# Patient Record
Sex: MALE | Race: WHITE | NOT HISPANIC OR LATINO | Employment: OTHER | URBAN - METROPOLITAN AREA
[De-identification: names, ages, dates, MRNs, and addresses within clinical notes are randomized per-mention and may not be internally consistent; named-entity substitution may affect disease eponyms.]

---

## 2024-04-13 ENCOUNTER — HOSPITAL ENCOUNTER (EMERGENCY)
Facility: HOSPITAL | Age: 74
Discharge: HOME | End: 2024-04-13
Attending: EMERGENCY MEDICINE
Payer: MEDICARE

## 2024-04-13 VITALS
SYSTOLIC BLOOD PRESSURE: 122 MMHG | DIASTOLIC BLOOD PRESSURE: 76 MMHG | TEMPERATURE: 98 F | WEIGHT: 201.75 LBS | OXYGEN SATURATION: 98 % | HEART RATE: 60 BPM | RESPIRATION RATE: 15 BRPM

## 2024-04-13 DIAGNOSIS — D72.829 LEUKOCYTOSIS, UNSPECIFIED TYPE: ICD-10-CM

## 2024-04-13 DIAGNOSIS — R53.1 WEAKNESS: ICD-10-CM

## 2024-04-13 DIAGNOSIS — N18.2 STAGE 2 CHRONIC KIDNEY DISEASE: ICD-10-CM

## 2024-04-13 DIAGNOSIS — R20.2 PARESTHESIAS: Primary | ICD-10-CM

## 2024-04-13 LAB
ALBUMIN SERPL BCP-MCNC: 4.1 G/DL (ref 3.5–5.2)
ALP SERPL-CCNC: 55 U/L (ref 55–135)
ALT SERPL W/O P-5'-P-CCNC: 16 U/L (ref 10–44)
ANION GAP SERPL CALC-SCNC: 11 MMOL/L (ref 8–16)
AST SERPL-CCNC: 16 U/L (ref 10–40)
BASOPHILS # BLD AUTO: 0.05 K/UL (ref 0–0.2)
BASOPHILS NFR BLD: 0.3 % (ref 0–1.9)
BILIRUB SERPL-MCNC: 0.7 MG/DL (ref 0.1–1)
BILIRUB UR QL STRIP: NEGATIVE
BUN SERPL-MCNC: 41 MG/DL (ref 8–23)
CALCIUM SERPL-MCNC: 9.2 MG/DL (ref 8.7–10.5)
CHLORIDE SERPL-SCNC: 106 MMOL/L (ref 95–110)
CK SERPL-CCNC: 88 U/L (ref 20–200)
CLARITY UR: CLEAR
CO2 SERPL-SCNC: 22 MMOL/L (ref 23–29)
COLOR UR: YELLOW
CREAT SERPL-MCNC: 1.8 MG/DL (ref 0.5–1.4)
DIFFERENTIAL METHOD BLD: ABNORMAL
EOSINOPHIL # BLD AUTO: 0.1 K/UL (ref 0–0.5)
EOSINOPHIL NFR BLD: 1 % (ref 0–8)
ERYTHROCYTE [DISTWIDTH] IN BLOOD BY AUTOMATED COUNT: 14.3 % (ref 11.5–14.5)
EST. GFR  (NO RACE VARIABLE): 39 ML/MIN/1.73 M^2
GLUCOSE SERPL-MCNC: 120 MG/DL (ref 70–110)
GLUCOSE UR QL STRIP: NEGATIVE
HCT VFR BLD AUTO: 44.8 % (ref 40–54)
HGB BLD-MCNC: 14.7 G/DL (ref 14–18)
HGB UR QL STRIP: NEGATIVE
IMM GRANULOCYTES # BLD AUTO: 0.26 K/UL (ref 0–0.04)
IMM GRANULOCYTES NFR BLD AUTO: 1.8 % (ref 0–0.5)
KETONES UR QL STRIP: NEGATIVE
LEUKOCYTE ESTERASE UR QL STRIP: NEGATIVE
LYMPHOCYTES # BLD AUTO: 1 K/UL (ref 1–4.8)
LYMPHOCYTES NFR BLD: 6.5 % (ref 18–48)
MCH RBC QN AUTO: 28.2 PG (ref 27–31)
MCHC RBC AUTO-ENTMCNC: 32.8 G/DL (ref 32–36)
MCV RBC AUTO: 86 FL (ref 82–98)
MONOCYTES # BLD AUTO: 1 K/UL (ref 0.3–1)
MONOCYTES NFR BLD: 6.8 % (ref 4–15)
NEUTROPHILS # BLD AUTO: 12.2 K/UL (ref 1.8–7.7)
NEUTROPHILS NFR BLD: 83.6 % (ref 38–73)
NITRITE UR QL STRIP: NEGATIVE
NRBC BLD-RTO: 0 /100 WBC
PH UR STRIP: 6 [PH] (ref 5–8)
PLATELET # BLD AUTO: 246 K/UL (ref 150–450)
PMV BLD AUTO: 9.2 FL (ref 9.2–12.9)
POCT GLUCOSE: 110 MG/DL (ref 70–110)
POTASSIUM SERPL-SCNC: 4.5 MMOL/L (ref 3.5–5.1)
PROT SERPL-MCNC: 7.2 G/DL (ref 6–8.4)
PROT UR QL STRIP: NEGATIVE
RBC # BLD AUTO: 5.22 M/UL (ref 4.6–6.2)
SODIUM SERPL-SCNC: 139 MMOL/L (ref 136–145)
SP GR UR STRIP: 1.02 (ref 1–1.03)
TROPONIN I SERPL DL<=0.01 NG/ML-MCNC: <0.006 NG/ML (ref 0–0.03)
URN SPEC COLLECT METH UR: NORMAL
UROBILINOGEN UR STRIP-ACNC: NEGATIVE EU/DL
WBC # BLD AUTO: 14.6 K/UL (ref 3.9–12.7)

## 2024-04-13 PROCEDURE — 93010 ELECTROCARDIOGRAM REPORT: CPT | Mod: ,,, | Performed by: INTERNAL MEDICINE

## 2024-04-13 PROCEDURE — 93005 ELECTROCARDIOGRAM TRACING: CPT

## 2024-04-13 PROCEDURE — 82962 GLUCOSE BLOOD TEST: CPT

## 2024-04-13 PROCEDURE — 99285 EMERGENCY DEPT VISIT HI MDM: CPT | Mod: 25

## 2024-04-13 PROCEDURE — 81003 URINALYSIS AUTO W/O SCOPE: CPT | Performed by: EMERGENCY MEDICINE

## 2024-04-13 PROCEDURE — 82550 ASSAY OF CK (CPK): CPT | Performed by: EMERGENCY MEDICINE

## 2024-04-13 PROCEDURE — 80053 COMPREHEN METABOLIC PANEL: CPT | Performed by: EMERGENCY MEDICINE

## 2024-04-13 PROCEDURE — 84484 ASSAY OF TROPONIN QUANT: CPT | Performed by: EMERGENCY MEDICINE

## 2024-04-13 PROCEDURE — 85025 COMPLETE CBC W/AUTO DIFF WBC: CPT | Performed by: EMERGENCY MEDICINE

## 2024-04-13 NOTE — ED PROVIDER NOTES
SCRIBE #1 NOTE: I, Bakari Costa, am scribing for, and in the presence of, Ann Roy MD. I have scribed the entire note.       History     Chief Complaint   Patient presents with    Numbness     Pt states he was working in the year when he suddenly has numbness and tingling on his entire right side. Pt states it only lasted 5-10 seconds but now has some lasting weakness on the right side.       Review of patient's allergies indicates:  No Known Allergies      History of Present Illness     HPI    4/13/2024, 1:34 PM  History obtained from the patient      History of Present Illness: Ben Carlos is a 73 y.o. male patient with a PMHx of asthma and cancer 5 years ago who presents to the Emergency Department for evaluation of right sided numbness which onset suddenly while working outside in the yard this morning. Pt went down to the ground and the numbness lasted for 10 seconds. Pt feels that his  strength is much weaker on the right sided after numbness resolved. Symptoms are constant and moderate in severity. No mitigating or exacerbating factors reported. No associated sxs. Patient denies any back pain, fever, CP, SOB, and all other sxs at this time. No Prior Tx. No further complaints or concerns at this time.       Arrival mode: Personal vehicle     PCP: No primary care provider on file.        Past Medical History:  No past medical history on file.    Past Surgical History:  No past surgical history on file.      Family History:  No family history on file.    Social History:  Social History     Tobacco Use    Smoking status: Not on file    Smokeless tobacco: Not on file   Substance and Sexual Activity    Alcohol use: Not on file    Drug use: Not on file    Sexual activity: Not on file        Review of Systems     Review of Systems   Constitutional:  Negative for chills and fever.   HENT:  Negative for congestion and sore throat.    Respiratory:  Negative for cough and shortness of breath.     Cardiovascular:  Negative for chest pain.   Gastrointestinal:  Negative for abdominal pain, nausea and vomiting.   Genitourinary:  Negative for dysuria.   Musculoskeletal:  Negative for back pain.   Skin:  Negative for rash.   Neurological:  Positive for weakness. Negative for dizziness, light-headedness, numbness (resolved) and headaches.   Hematological:  Does not bruise/bleed easily.   All other systems reviewed and are negative.       Physical Exam     Initial Vitals [04/13/24 1314]   BP Pulse Resp Temp SpO2   111/65 87 16 97.9 °F (36.6 °C) 98 %      MAP       --          Physical Exam  Nursing Notes and Vital Signs Reviewed.  Constitutional: Patient is in no acute distress. Well-developed and well-nourished.  Head: Atraumatic. Normocephalic.  Eyes: PERRL. EOM intact. Conjunctivae are not pale. No scleral icterus.  ENT: dry mucous membranes. Oropharynx is clear and symmetric.    Neck: Supple. Full ROM. No lymphadenopathy.  Cardiovascular: Regular rate. Regular rhythm. No murmurs, rubs, or gallops. Distal pulses are 2+ and symmetric.  Pulmonary/Chest: No respiratory distress. Clear to auscultation bilaterally. No wheezing or rales.  Abdominal: Soft and non-distended.  There is no tenderness.  No rebound, guarding, or rigidity. Good bowel sounds.  Genitourinary: No CVA tenderness  Musculoskeletal: Moves all extremities. No obvious deformities. No edema. No calf tenderness.   Skin: Warm and dry.  Neurological:  Alert, awake, and appropriate.  Normal speech.  No acute focal neurological deficits are appreciated. No facial droop. Equal  strength. No pronator drift. Normal gait. NIH 0  Psychiatric: Normal affect. Good eye contact. Appropriate in content.     ED Course   Procedures  ED Vital Signs:  Vitals:    04/13/24 1314 04/13/24 1419 04/13/24 1422 04/13/24 1424   BP: 111/65 (!) 126/59 122/62 124/66   Pulse: 87 64 (!) 58 73   Resp: 16      Temp: 97.9 °F (36.6 °C)      TempSrc: Oral      SpO2: 98% 99% 98% 98%    Weight: 91.5 kg (201 lb 11.5 oz)       04/13/24 1523 04/13/24 1524 04/13/24 1651 04/13/24 1654   BP: (!) 152/68   122/76   Pulse: 62   60   Resp:  17 15    Temp:       TempSrc:       SpO2: 97%   98%   Weight:        04/13/24 1708   BP:    Pulse:    Resp:    Temp: 98.1 °F (36.7 °C)   TempSrc: Oral   SpO2:    Weight:        Abnormal Lab Results:  Labs Reviewed   CBC W/ AUTO DIFFERENTIAL - Abnormal; Notable for the following components:       Result Value    WBC 14.60 (*)     Immature Granulocytes 1.8 (*)     Gran # (ANC) 12.2 (*)     Immature Grans (Abs) 0.26 (*)     Gran % 83.6 (*)     Lymph % 6.5 (*)     All other components within normal limits   COMPREHENSIVE METABOLIC PANEL - Abnormal; Notable for the following components:    CO2 22 (*)     Glucose 120 (*)     BUN 41 (*)     Creatinine 1.8 (*)     eGFR 39 (*)     All other components within normal limits   CK   TROPONIN I   URINALYSIS, REFLEX TO URINE CULTURE    Narrative:     Specimen Source->Urine   POCT GLUCOSE   POCT GLUCOSE MONITORING CONTINUOUS        All Lab Results:  Results for orders placed or performed during the hospital encounter of 04/13/24   CBC auto differential   Result Value Ref Range    WBC 14.60 (H) 3.90 - 12.70 K/uL    RBC 5.22 4.60 - 6.20 M/uL    Hemoglobin 14.7 14.0 - 18.0 g/dL    Hematocrit 44.8 40.0 - 54.0 %    MCV 86 82 - 98 fL    MCH 28.2 27.0 - 31.0 pg    MCHC 32.8 32.0 - 36.0 g/dL    RDW 14.3 11.5 - 14.5 %    Platelets 246 150 - 450 K/uL    MPV 9.2 9.2 - 12.9 fL    Immature Granulocytes 1.8 (H) 0.0 - 0.5 %    Gran # (ANC) 12.2 (H) 1.8 - 7.7 K/uL    Immature Grans (Abs) 0.26 (H) 0.00 - 0.04 K/uL    Lymph # 1.0 1.0 - 4.8 K/uL    Mono # 1.0 0.3 - 1.0 K/uL    Eos # 0.1 0.0 - 0.5 K/uL    Baso # 0.05 0.00 - 0.20 K/uL    nRBC 0 0 /100 WBC    Gran % 83.6 (H) 38.0 - 73.0 %    Lymph % 6.5 (L) 18.0 - 48.0 %    Mono % 6.8 4.0 - 15.0 %    Eosinophil % 1.0 0.0 - 8.0 %    Basophil % 0.3 0.0 - 1.9 %    Differential Method Automated    Comprehensive  metabolic panel   Result Value Ref Range    Sodium 139 136 - 145 mmol/L    Potassium 4.5 3.5 - 5.1 mmol/L    Chloride 106 95 - 110 mmol/L    CO2 22 (L) 23 - 29 mmol/L    Glucose 120 (H) 70 - 110 mg/dL    BUN 41 (H) 8 - 23 mg/dL    Creatinine 1.8 (H) 0.5 - 1.4 mg/dL    Calcium 9.2 8.7 - 10.5 mg/dL    Total Protein 7.2 6.0 - 8.4 g/dL    Albumin 4.1 3.5 - 5.2 g/dL    Total Bilirubin 0.7 0.1 - 1.0 mg/dL    Alkaline Phosphatase 55 55 - 135 U/L    AST 16 10 - 40 U/L    ALT 16 10 - 44 U/L    eGFR 39 (A) >60 mL/min/1.73 m^2    Anion Gap 11 8 - 16 mmol/L   CPK   Result Value Ref Range    CPK 88 20 - 200 U/L   Troponin I   Result Value Ref Range    Troponin I <0.006 0.000 - 0.026 ng/mL   Urinalysis, Reflex to Urine Culture Urine, Clean Catch    Specimen: Urine   Result Value Ref Range    Specimen UA Urine, Clean Catch     Color, UA Yellow Yellow, Straw, Yvonne    Appearance, UA Clear Clear    pH, UA 6.0 5.0 - 8.0    Specific Gravity, UA 1.020 1.005 - 1.030    Protein, UA Negative Negative    Glucose, UA Negative Negative    Ketones, UA Negative Negative    Bilirubin (UA) Negative Negative    Occult Blood UA Negative Negative    Nitrite, UA Negative Negative    Urobilinogen, UA Negative <2.0 EU/dL    Leukocytes, UA Negative Negative   POCT glucose   Result Value Ref Range    POCT Glucose 110 70 - 110 mg/dL         Imaging Results:  Imaging Results              CT Head Without Contrast (Final result)  Result time 04/13/24 13:41:04      Final result by Juan Nieves MD (04/13/24 13:41:04)                   Impression:      No acute intracranial CT abnormality.    All CT scans at this facility are performed  using dose modulation techniques as appropriate to performed exam including the following:  automated exposure control; adjustment of mA and/or kV according to the patients size (this includes techniques or standardized protocols for targeted exams where dose is matched to indication/reason for exam: i.e. extremities or  head);  iterative reconstruction technique.      Electronically signed by: Juan Nieves  Date:    04/13/2024  Time:    13:41               Narrative:    EXAMINATION:  CT HEAD WITHOUT CONTRAST    CLINICAL HISTORY:  Transient ischemic attack (TIA);    TECHNIQUE:  Low dose axial CT images obtained throughout the head without intravenous contrast. Sagittal and coronal reconstructions were performed.    COMPARISON:  None.    FINDINGS:  Intracranial compartment:    Ventricles and sulci are normal in size for age without evidence of hydrocephalus. No extra-axial blood or fluid collections.    The brain parenchyma appears normal. No parenchymal mass, hemorrhage, edema or major vascular distribution infarct.    Skull/extracranial contents (limited evaluation): No fracture. Mastoid air cells and paranasal sinuses are essentially clear.                                       The EKG was ordered, reviewed, and independently interpreted by the ED provider.  Interpretation time: 14:10  Rate: 59 BPM  Rhythm: sinus bradycardia with marked sinus arrythmia  Interpretation: No acute ST changes. No STEMI.             The Emergency Provider reviewed the vital signs and test results, which are outlined above.     ED Discussion       4:19 PM: Reassessed pt at this time. Discussed with pt all pertinent ED information and result. Discussed pt dx and plan of tx. Gave pt all f/u and return to the ED instructions. All questions and concerns were addressed at this time. Pt expresses understanding of information and instructions, and is comfortable with plan to discharge. Pt is stable for discharge.    I discussed with patient and/or family/caretaker that evaluation in the ED does not suggest any emergent or life threatening medical conditions requiring immediate intervention beyond what was provided in the ED, and I believe patient is safe for discharge.  Regardless, an unremarkable evaluation in the ED does not preclude the development or  presence of a serious of life threatening condition. As such, patient was instructed to return immediately for any worsening or change in current symptoms.        Medical Decision Making  DDX: 1. Dehydration 2. Electrolyte Dysfunction 3. TIA    Patient's neuro exam is grossly normal, NIH 0, ECG reviewed and no acute ishcemic changes noted, CT head completely normal, lab work reviewed and mild leukoyctosis noted, there is no concern at this point for infection, cardiac markers are normal, has known CKD and kidney function appears at baseline, admission initially considered for TIA workup however patient's symptoms lasted less than 1 minute, his exam is normal, CT head negative, he is feeling better and was outside working so dehydration could have played into his symptoms, overall feel he is safe for discharge, reasons to return given.     Amount and/or Complexity of Data Reviewed  Labs: ordered. Decision-making details documented in ED Course.  Radiology: ordered. Decision-making details documented in ED Course.  ECG/medicine tests: ordered and independent interpretation performed. Decision-making details documented in ED Course.    Risk  Decision regarding hospitalization.                ED Medication(s):  Medications - No data to display    There are no discharge medications for this patient.       Follow-up Information       Cristian Camacho MD. Schedule an appointment as soon as possible for a visit in 2 days.    Specialty: Internal Medicine  Why: Return to the Emergency Room, If symptoms worsen  Contact information:  [address]  [facility] [id number] [phone]                                 Scribe Attestation:   Scribe #1: I performed the above scribed service and the documentation accurately describes the services I performed. I attest to the accuracy of the note.     Attending:   Physician Attestation Statement for Scribe #1: I, Ann Roy MD, personally performed the services described in  this documentation, as scribed by Bakari Costa, in my presence, and it is both accurate and complete.           Clinical Impression       ICD-10-CM ICD-9-CM   1. Paresthesias  R20.2 782.0   2. Weakness  R53.1 780.79   3. Leukocytosis, unspecified type  D72.829 288.60   4. Stage 2 chronic kidney disease  N18.2 585.2       Disposition:   Disposition: Discharged  Condition: Stable        Ann Roy MD  04/18/24 0944

## 2024-04-14 LAB
OHS QRS DURATION: 90 MS
OHS QTC CALCULATION: 409 MS